# Patient Record
Sex: MALE | Race: WHITE | NOT HISPANIC OR LATINO
[De-identification: names, ages, dates, MRNs, and addresses within clinical notes are randomized per-mention and may not be internally consistent; named-entity substitution may affect disease eponyms.]

---

## 2018-03-13 ENCOUNTER — TRANSCRIPTION ENCOUNTER (OUTPATIENT)
Age: 68
End: 2018-03-13

## 2019-06-18 PROBLEM — Z00.00 ENCOUNTER FOR PREVENTIVE HEALTH EXAMINATION: Status: ACTIVE | Noted: 2019-06-18

## 2019-06-26 ENCOUNTER — APPOINTMENT (OUTPATIENT)
Dept: UROLOGY | Facility: CLINIC | Age: 69
End: 2019-06-26
Payer: MEDICARE

## 2019-06-26 PROCEDURE — 99204 OFFICE O/P NEW MOD 45 MIN: CPT

## 2019-06-26 NOTE — ASSESSMENT
[FreeTextEntry1] : Elevated PSA and concerning 4K, with very low risk appearing MRIx2 and 2 negative biopsy. \par \par Discussed the diagnostic dilemma in this case with patient. \par Reviewed option of TRUS biopy, saturation biopsy, follow up PSA/4K, MRI \par \par Recommend PSA, 4K in 6 mo, and MRI as well but at [facility] or [facility] for optimal protocol and experience of radiologist.

## 2019-06-26 NOTE — PHYSICAL EXAM
[General Appearance - Well Developed] : well developed [General Appearance - Well Nourished] : well nourished [Normal Appearance] : normal appearance [Well Groomed] : well groomed [General Appearance - In No Acute Distress] : no acute distress [Edema] : no peripheral edema [] : no respiratory distress [Exaggerated Use Of Accessory Muscles For Inspiration] : no accessory muscle use [Abdomen Soft] : soft [Respiration, Rhythm And Depth] : normal respiratory rhythm and effort [Abdomen Tenderness] : non-tender [Costovertebral Angle Tenderness] : no ~M costovertebral angle tenderness [Urethral Meatus] : meatus normal [Scrotum] : the scrotum was normal [Urinary Bladder Findings] : the bladder was normal on palpation [Penis Abnormality] : normal circumcised penis [Testes Mass (___cm)] : there were no testicular masses [Epididymis] : the epididymides were normal [Testes Tenderness] : no tenderness of the testes [Normal Station and Gait] : the gait and station were normal for the patient's age [No Prostate Nodules] : no prostate nodules [Prostate Size ___ gm] : prostate size [unfilled] gm [No Palpable Adenopathy] : no palpable adenopathy

## 2019-06-26 NOTE — HISTORY OF PRESENT ILLNESS
[FreeTextEntry1] : CC: Elevated PSA \par \par HPI: Patient with a history of elevated PSA. \par \par PSA: 14 ng/ml 3/2019\par PSA: 16.8 ng/ml 12/2017\par 4K high risk; 58%, 12/2017\par \par MRI 5/19 and MRI 12/17 both PIRAD1\par \par Two biopsy negative; 7/2017 negative biopsy and \par \par FAMHX: no family history \par SURGHX: laminectomy  \par SOCIAL:  nonsmoker\par ROS: dry eyes, YOANA, otherwise negative 10 system\par

## 2022-05-19 ENCOUNTER — APPOINTMENT (OUTPATIENT)
Dept: UROLOGY | Facility: CLINIC | Age: 72
End: 2022-05-19
Payer: MEDICARE

## 2022-05-19 DIAGNOSIS — F10.21 ALCOHOL DEPENDENCE, IN REMISSION: ICD-10-CM

## 2022-05-19 DIAGNOSIS — Z86.69 PERSONAL HISTORY OF OTHER DISEASES OF THE NERVOUS SYSTEM AND SENSE ORGANS: ICD-10-CM

## 2022-05-19 DIAGNOSIS — Z86.79 PERSONAL HISTORY OF OTHER DISEASES OF THE CIRCULATORY SYSTEM: ICD-10-CM

## 2022-05-19 DIAGNOSIS — Z87.438 PERSONAL HISTORY OF OTHER DISEASES OF MALE GENITAL ORGANS: ICD-10-CM

## 2022-05-19 DIAGNOSIS — Z87.891 PERSONAL HISTORY OF NICOTINE DEPENDENCE: ICD-10-CM

## 2022-05-19 DIAGNOSIS — R39.9 UNSPECIFIED SYMPTOMS AND SIGNS INVOLVING THE GENITOURINARY SYSTEM: ICD-10-CM

## 2022-05-19 DIAGNOSIS — Z86.39 PERSONAL HISTORY OF OTHER ENDOCRINE, NUTRITIONAL AND METABOLIC DISEASE: ICD-10-CM

## 2022-05-19 PROCEDURE — 99214 OFFICE O/P EST MOD 30 MIN: CPT | Mod: 95

## 2022-05-19 RX ORDER — ATORVASTATIN CALCIUM 80 MG/1
TABLET, FILM COATED ORAL
Refills: 0 | Status: ACTIVE | COMMUNITY

## 2022-05-19 RX ORDER — LOSARTAN POTASSIUM 100 MG/1
TABLET, FILM COATED ORAL
Refills: 0 | Status: ACTIVE | COMMUNITY

## 2022-05-19 RX ORDER — TAMSULOSIN HYDROCHLORIDE 0.4 MG/1
0.4 CAPSULE ORAL
Qty: 90 | Refills: 3 | Status: ACTIVE | COMMUNITY
Start: 2022-05-19 | End: 1900-01-01

## 2022-05-19 RX ORDER — ATENOLOL 50 MG/1
TABLET ORAL
Refills: 0 | Status: ACTIVE | COMMUNITY

## 2022-05-19 RX ORDER — FINASTERIDE 1 MG/1
TABLET ORAL
Refills: 0 | Status: ACTIVE | COMMUNITY

## 2022-05-19 RX ORDER — NALTREXONE HYDROCHLORIDE 50 MG/1
TABLET, FILM COATED ORAL
Refills: 0 | Status: ACTIVE | COMMUNITY

## 2022-05-20 PROBLEM — F10.21 HISTORY OF ALCOHOLISM: Status: RESOLVED | Noted: 2022-05-20 | Resolved: 2022-05-20

## 2022-05-20 PROBLEM — Z86.79 HISTORY OF HYPERTENSION: Status: RESOLVED | Noted: 2022-05-20 | Resolved: 2022-05-20

## 2022-05-20 PROBLEM — Z87.438 HISTORY OF BENIGN PROSTATIC HYPERPLASIA: Status: RESOLVED | Noted: 2022-05-20 | Resolved: 2022-05-20

## 2022-05-20 PROBLEM — Z86.69 HISTORY OF OBSTRUCTIVE SLEEP APNEA: Status: RESOLVED | Noted: 2022-05-20 | Resolved: 2022-05-20

## 2022-05-20 PROBLEM — Z87.891 FORMER SMOKER: Status: ACTIVE | Noted: 2022-05-20

## 2022-05-20 PROBLEM — Z86.39 HISTORY OF HYPERLIPIDEMIA: Status: RESOLVED | Noted: 2022-05-20 | Resolved: 2022-05-20

## 2022-05-20 PROBLEM — R39.9 LOWER URINARY TRACT SYMPTOMS (LUTS): Status: ACTIVE | Noted: 2022-05-20

## 2022-05-20 NOTE — HISTORY OF PRESENT ILLNESS
[Home] : at home, [unfilled] , at the time of the visit. [Medical Office: ([facility])___] : at the medical office located in  [Verbal consent obtained from patient] : the patient, [unfilled] [FreeTextEntry1] : AMWELL FAILED\par SALVAGED BY TELEPHONE\par \par Dear Dr. Hernan Maldonado (Urologist)\par Dear Dr. Laz Miller (PCP)\par \par Thank you so much for the referral to help care for your patient.\par \par Chief Complaint: Elevated PSA\par Date of first visit: 05/19/2022\par Occupation: \par \par MADDI KYLE  is a 71 year old male with a PMHx of HTN, HLD, BPH, a pituitary adenoma, and YOANA who presents for a prostate biopsy consult. His PSA has been elevated for years. He is on finasteride and does not remember how long he has been taking it, but he knows he has not started any new medications recently. His PSA is 18 ng/ml (corrected for finasteride). His 4K score was 58% in 12/2017. Prior MRIs done in 2017 and 2019 were PIRADS 1, negative for suspicious lesions. An MRI done on 05/16/2022 demonstrated a PIRADS 4 lesion to the left mid anterior transition zone. His PSAD is abnormal (1.05 ng/ml/cc). He has had two prior biopsies, both were benign. He is a former smoker, but does not remember how long he smoked for. He quit years ago. He denies a family history of  malignancies. \par \par Moderate LUTS. He reports frequency, urgency, and occasional double voiding. Nocturia x 3. He is on finasteride, but does not remember when he started it. He knows he has not started any new medications recently. He denies dysuria and hematuria. He is interested in trying alpha blockers. \par \par 4K Score(12/2017)- 58%\par \par PSA History\par 05/2022- 18 (corrected for finasteride 9 x 2- per chart review)\par 03/22/2019- 14.1 \par 12/11/2017- 16.79\par \par MRI History\par MRI 05/16/2022.  17 cc prostate with a PIRADS 4 lesion to the left/mid anterior transition zone measuring 1.2 x 1.3 cm. There is patchy signal abnormality in the left peripheral zone. No LAD No EPE, No Bony Lesions.  The clinical implications discussed with the patient.\par \par MRI 05/23/2019.  30 cc prostate, PIRADS 1, no suspicious lesions. No LAD No EPE, No Bony Lesions.  The clinical implications discussed with the patient.\par \par MRI 12/18/2017.  35 cc prostate, PIRADS 1, no suspicious lesions. No LAD No EPE, No Bony Lesions.  The clinical implications discussed with the patient.\par \par Biopsy History \par 07/2017- 13/13 cores benign\par Had another negative biopsy, does not remember the year.\par \par 05/19/2022\par IPSS 15 QOL 0\par \par Prostate cancer screening: the patient and I spoke at length about prostate cancer screening, its risks and its benefits. The patient has 2 (age, elevated PSA) risk factors for prostate cancer.  He understands that many men with prostate cancer will die with the disease rather than of it and we also discussed the results large multi-center American and  prostate cancer screening trials. He also understands that PSA in and of itself does not diagnose prostate cancer but only assesses risk to a certain degree. The patient understands that to definitively screen for prostate cancer, a biopsy is required and this procedure has risks, including bleeding, infection, ED and urinary retention. The patient opted to proceed with a fusion biopsy.\par \par The patient denies fevers, chills, nausea and or vomiting and no unexplained weight loss.\par \par All pertinent laboratory results and physician notes were reviewed.  Questionnaire results were discussed with patient.

## 2022-05-20 NOTE — ASSESSMENT
[FreeTextEntry1] : MADDI KYLE  is a 71 year old male with a PMHx of HTN, HLD, BPH, a pituitary adenoma, and YOANA who presents for a prostate biopsy consult. His PSA has been elevated for years. He is on finasteride and does not remember how long he has been taking it, but he knows he has not started any new medications recently. His PSA is 18 ng/ml (corrected for finasteride). His 4K score was 58% in 12/2017. Prior MRIs done in 2017 and 2019 were PIRADS 1, negative for suspicious lesions. An MRI done on 05/16/2022 demonstrated a PIRADS 4 lesion to the left mid anterior transition zone. His PSAD is abnormal (1.05 ng/ml/cc). He has had two prior biopsies, both were benign. He is a former smoker, but does not remember how long he smoked for. He quit years ago. He denies a family history of  malignancies.\par \par 1. Obtain all prior MRI images/PSA lab results\par 2. Book for biopsy\par \par He understands that many men with prostate cancer will die with the disease rather than of it and we also discussed the results large multi-center American and  prostate cancer screening trials. He also understands that PSA in and of itself does not diagnose prostate cancer but only assesses risk to a certain degree. The patient understands that to definitively screen for prostate cancer, a biopsy is required and this procedure has risks, including bleeding, infection, ED and urinary retention. The patient opted to move forward with the biopsy.\par \par The patient is aware to expect hematuria x 2 weeks and up to 4 weeks of hematospermia.  There is a risk of infection albeit much lower than a transrectal approach. In some cases, patients can experience erectile dysfunction but this is usually self limiting.  Any fever/chills after the biopsy, the patient is to contact the office and go to the ER for an immediate evaluation. He has been given paper instructions outlining these items - which includes medications to avoid prior to surgery.\par \par \par 1. CBC, BMP, PSA, Covid Test, UA UCx, EKG, CXR, YOANA report\par 2. Medical Clearance\par 3. TP biopsy at [facility]\par 4. Follow up 2 weeks after biopsy with his primary urologist or ourselves.\par 5. We will call with the path results once they are resulted.\par 6. LUTS- Start Flomax 0.4 mg daily. The patient understands that this medication may cause dizziness, retrograde ejaculation or nasal congestion among other complications. I recommended that the patient take this medication at night. If the side effects become too bothersome, the medication can be discontinued.\par \par \par Follow up in office for MRI review/physical exam prior to biopsy date given.\par \par Tami Tadeo NP was present and available for consult for the entirety of this visit.\par

## 2022-05-20 NOTE — HISTORY OF PRESENT ILLNESS
[Home] : at home, [unfilled] , at the time of the visit. [Medical Office: ([facility])___] : at the medical office located in  [Verbal consent obtained from patient] : the patient, [unfilled] [FreeTextEntry1] : AMWELL FAILED\par SALVAGED BY TELEPHONE\par \par Dear Dr. Hernan Maldonado (Urologist)\par Dear Dr. Laz Miller (PCP)\par \par Thank you so much for the referral to help care for your patient.\par \par Chief Complaint: Elevated PSA\par Date of first visit: 05/19/2022\par Occupation: \par \par MADDI KYLE  is a 71 year old male with a PMHx of HTN, HLD, BPH, a pituitary adenoma, and YOANA who presents for a prostate biopsy consult. His PSA has been elevated for years. He is on finasteride and does not remember how long he has been taking it, but he knows he has not started any new medications recently. His PSA is 18 ng/ml (corrected for finasteride). His 4K score was 58% in 12/2017. Prior MRIs done in 2017 and 2019 were PIRADS 1, negative for suspicious lesions. An MRI done on 05/16/2022 demonstrated a PIRADS 4 lesion to the left mid anterior transition zone. His PSAD is abnormal (1.05 ng/ml/cc). He has had two prior biopsies, both were benign. He is a former smoker, but does not remember how long he smoked for. He quit years ago. He denies a family history of  malignancies. \par \par Moderate LUTS. He reports frequency, urgency, and occasional double voiding. Nocturia x 3. He is on finasteride, but does not remember when he started it. He knows he has not started any new medications recently. He denies dysuria and hematuria. He is interested in trying alpha blockers. \par \par 4K Score(12/2017)- 58%\par \par PSA History\par 05/2022- 18 (corrected for finasteride 9 x 2- per chart review)\par 03/22/2019- 14.1 \par 12/11/2017- 16.79\par \par MRI History\par MRI 05/16/2022.  17 cc prostate with a PIRADS 4 lesion to the left/mid anterior transition zone measuring 1.2 x 1.3 cm. There is patchy signal abnormality in the left peripheral zone. No LAD No EPE, No Bony Lesions.  The clinical implications discussed with the patient.\par \par MRI 05/23/2019.  30 cc prostate, PIRADS 1, no suspicious lesions. No LAD No EPE, No Bony Lesions.  The clinical implications discussed with the patient.\par \par MRI 12/18/2017.  35 cc prostate, PIRADS 1, no suspicious lesions. No LAD No EPE, No Bony Lesions.  The clinical implications discussed with the patient.\par \par Biopsy History \par 07/2017- 13/13 cores benign\par Had another negative biopsy, does not remember the year.\par \par 05/19/2022\par IPSS 15 QOL 0\par \par Prostate cancer screening: the patient and I spoke at length about prostate cancer screening, its risks and its benefits. The patient has 2 (age, elevated PSA) risk factors for prostate cancer.  He understands that many men with prostate cancer will die with the disease rather than of it and we also discussed the results large multi-center American and  prostate cancer screening trials. He also understands that PSA in and of itself does not diagnose prostate cancer but only assesses risk to a certain degree. The patient understands that to definitively screen for prostate cancer, a biopsy is required and this procedure has risks, including bleeding, infection, ED and urinary retention. The patient opted to proceed with a fusion biopsy.\par \par The patient denies fevers, chills, nausea and or vomiting and no unexplained weight loss.\par \par All pertinent laboratory results and physician notes were reviewed.  Questionnaire results were discussed with patient.

## 2022-05-20 NOTE — ASSESSMENT
[FreeTextEntry1] : MADDI KYLE  is a 71 year old male with a PMHx of HTN, HLD, BPH, a pituitary adenoma, and YOANA who presents for a prostate biopsy consult. His PSA has been elevated for years. He is on finasteride and does not remember how long he has been taking it, but he knows he has not started any new medications recently. His PSA is 18 ng/ml (corrected for finasteride). His 4K score was 58% in 12/2017. Prior MRIs done in 2017 and 2019 were PIRADS 1, negative for suspicious lesions. An MRI done on 05/16/2022 demonstrated a PIRADS 4 lesion to the left mid anterior transition zone. His PSAD is abnormal (1.05 ng/ml/cc). He has had two prior biopsies, both were benign. He is a former smoker, but does not remember how long he smoked for. He quit years ago. He denies a family history of  malignancies.\par \par 1. Obtain all prior MRI images/PSA lab results\par 2. Book for biopsy\par \par He understands that many men with prostate cancer will die with the disease rather than of it and we also discussed the results large multi-center American and  prostate cancer screening trials. He also understands that PSA in and of itself does not diagnose prostate cancer but only assesses risk to a certain degree. The patient understands that to definitively screen for prostate cancer, a biopsy is required and this procedure has risks, including bleeding, infection, ED and urinary retention. The patient opted to move forward with the biopsy.\par \par The patient is aware to expect hematuria x 2 weeks and up to 4 weeks of hematospermia.  There is a risk of infection albeit much lower than a transrectal approach. In some cases, patients can experience erectile dysfunction but this is usually self limiting.  Any fever/chills after the biopsy, the patient is to contact the office and go to the ER for an immediate evaluation. He has been given paper instructions outlining these items - which includes medications to avoid prior to surgery.\par \par \par 1. CBC, BMP, PSA, Covid Test, UA UCx, EKG, CXR, YOANA report\par 2. Medical Clearance\par 3. TP biopsy at [facility]\par 4. Follow up 2 weeks after biopsy with his primary urologist or ourselves.\par 5. We will call with the path results once they are resulted.\par 6. LUTS- Start Flomax 0.4 mg daily. The patient understands that this medication may cause dizziness, retrograde ejaculation or nasal congestion among other complications. I recommended that the patient take this medication at night. If the side effects become too bothersome, the medication can be discontinued.\par \par \par Follow up in office for MRI review/physical exam prior to biopsy date given.\par \par Tami Tadeo NP was present and available for consult for the entirety of this visit.\par

## 2022-06-03 ENCOUNTER — NON-APPOINTMENT (OUTPATIENT)
Age: 72
End: 2022-06-03

## 2022-06-24 ENCOUNTER — NON-APPOINTMENT (OUTPATIENT)
Age: 72
End: 2022-06-24

## 2022-06-27 ENCOUNTER — APPOINTMENT (OUTPATIENT)
Dept: UROLOGY | Facility: CLINIC | Age: 72
End: 2022-06-27

## 2022-06-27 ENCOUNTER — OUTPATIENT (OUTPATIENT)
Dept: OUTPATIENT SERVICES | Facility: HOSPITAL | Age: 72
LOS: 1 days | End: 2022-06-27
Payer: MEDICARE

## 2022-06-27 VITALS
SYSTOLIC BLOOD PRESSURE: 115 MMHG | BODY MASS INDEX: 25.92 KG/M2 | HEART RATE: 70 BPM | WEIGHT: 175 LBS | DIASTOLIC BLOOD PRESSURE: 71 MMHG | HEIGHT: 69 IN | TEMPERATURE: 97.3 F | OXYGEN SATURATION: 97 %

## 2022-06-27 DIAGNOSIS — R97.20 ELEVATED PROSTATE, SPECIFIC ANTIGEN [PSA]: ICD-10-CM

## 2022-06-27 PROCEDURE — 99214 OFFICE O/P EST MOD 30 MIN: CPT

## 2022-06-27 PROCEDURE — 71046 X-RAY EXAM CHEST 2 VIEWS: CPT | Mod: 26

## 2022-06-28 NOTE — ASSESSMENT
[FreeTextEntry1] : 71 year old male with a PMHx of HTN, HLD, BPH, a pituitary adenoma, and YOANA who presents for a prostate biopsy consult. His PSA has been elevated for years. He is on finasteride and does not remember how long he has been taking it, but he knows he has not started any new medications recently. His PSA is 18 ng/ml (corrected for finasteride). His 4K score was 58% in 12/2017. Prior MRIs done in 2017 and 2019 were PIRADS 1, negative for suspicious lesions. An MRI done on 05/16/2022 demonstrated a PIRADS 4 lesion to the left mid anterior transition zone. His PSAD is abnormal (1.05 ng/ml/cc). He has had two prior biopsies, both were benign. He is a former smoker, but does not remember how long he smoked for. He quit years ago. He denies a family history of  malignancies.\par \par He understands that many men with prostate cancer will die with the disease rather than of it and we also discussed the results large multi-center American and  prostate cancer screening trials. He also understands that PSA in and of itself does not diagnose prostate cancer but only assesses risk to a certain degree. The patient understands that to definitively screen for prostate cancer, a biopsy is required and this procedure has risks, including bleeding, infection, ED and urinary retention. The patient opted to move forward with the biopsy.\par \par The patient is aware to expect hematuria x 2 weeks and up to 4 weeks of hematospermia.  There is a risk of infection albeit much lower than a transrectal approach. In some cases, patients can experience erectile dysfunction but this is usually self limiting.  Any fever/chills after the biopsy, the patient is to contact the office and go to the ER for an immediate evaluation. He has been given paper instructions outlining these items - which includes medications to avoid prior to surgery.\par \par \par 1. CBC, BMP, PSA, Covid Test, UA UCx, EKG, CXR, YOANA report. Plan to go for CXR today. \par 2. Medical Clearance\par 3. TP biopsy at [facility]\par 4. Follow up 2 weeks after biopsy with his primary urologist or ourselves.\par 5. We will call with the path results once they are resulted.\par 6. LUTS- Start Flomax 0.4 mg daily. The patient understands that this medication may cause dizziness, retrograde ejaculation or nasal congestion among other complications. I recommended that the patient take this medication at night. If the side effects become too bothersome, the medication can be discontinued.\par 7. Post biopsy appointment with Dr. Ernst and Dr. Maldonado. \par \par Thank you very much for allowing me to assist in the care of this patient. Should you have any additional questions or concerns please do not hesitate to contact me.\par \par \par Sincerely,\par \par \par Art JENIFER Ernst D.O.\par  of Urology and Radiology\par  of Urology at [facility]\par System Director for Prostate Cancer\par [address] [place] [address], [address] Floor [facility], [facility]\par Phone: [phone]\par

## 2022-06-28 NOTE — HISTORY OF PRESENT ILLNESS
[FreeTextEntry1] : Dear Dr. Hernan Maldonado (Urologist)\par Dear Dr. Laz Miller (PCP)\par \par Thank you so much for the referral to help care for your patient.\par \par Chief Complaint: Elevated PSA\par Date of first visit: 05/19/2022\par Occupation: [employer]\par \par MADDI KYLE  is a 71 year old male with a PMHx of HTN, HLD, BPH, a pituitary adenoma, and YOANA(does not use CPAP) who presents for a prostate biopsy consult. His PSA has been elevated for years. He is on finasteride and does not remember how long he has been taking it, but he knows he has not started any new medications recently. His PSA is 18 ng/ml (corrected for finasteride). His 4K score was 58% in 12/2017. Prior MRIs done in 2017 and 2019 were PIRADS 1, negative for suspicious lesions. An MRI done on 05/16/2022 demonstrated a PIRADS 4 lesion to the left mid anterior transition zone. His PSAD is abnormal (1.05 ng/ml/cc). He has had two prior biopsies, both were benign. He denies a family history of  malignancies. \par \par Moderate LUTS. He reports frequency, urgency, and occasional double voiding. Nocturia x 3. He is on finasteride, but does not remember when he started it). He knows he has not started any new medications recently. He denies dysuria and hematuria. He is interested in trying alpha blockers. \par \par 4K Score(12/2017)- 58%\par \par He is a former smoker, but does not remember how long he smoked for. He quit years ago.\par \par PSA History\par 05/2022- 18 (corrected for finasteride 9 x 2- per chart review). PSAD 1.05 ng/ml/cc\par 03/22/2019- 14.1 \par 12/11/2017- 16.79\par \par MRI History\par MRI 05/16/2022.  17 cc prostate with a PIRADS 4 lesion to the left/mid anterior transition zone measuring 1.2 x 1.3 cm. There is patchy signal abnormality in the left peripheral zone. No LAD No EPE, No Bony Lesions.  The images and clinical implications discussed with the patient. We compared this MRI to his 2017 MRI. Plan to sample anterior portion. \par \par MRI 05/23/2019.  30 cc prostate, PIRADS 1, no suspicious lesions. No LAD No EPE, No Bony Lesions.  The clinical implications discussed with the patient.\par \par MRI 12/18/2017.  35 cc prostate, PIRADS 1, no suspicious lesions. No LAD No EPE, No Bony Lesions.  The clinical implications discussed with the patient.\par \par Biopsy History \par 07/2017- 13/13 cores benign\par Had another negative biopsy, does not remember the year.\par \par 6/27/2022\par IPSS 7 QOL 2\par VON 5 (NSA)\par \par 05/19/2022\par IPSS 15 QOL 0\par \par Prostate cancer screening: the patient and I spoke at length about prostate cancer screening, its risks and its benefits. The patient has 2 (age, elevated PSA) risk factors for prostate cancer.  He understands that many men with prostate cancer will die with the disease rather than of it and we also discussed the results large multi-center American and  prostate cancer screening trials. He also understands that PSA in and of itself does not diagnose prostate cancer but only assesses risk to a certain degree. The patient understands that to definitively screen for prostate cancer, a biopsy is required and this procedure has risks, including bleeding, infection, ED and urinary retention. The patient opted to proceed with a fusion biopsy.\par \par The patient denies fevers, chills, nausea and or vomiting and no unexplained weight loss.\par \par All pertinent laboratory results and physician notes were reviewed.  Questionnaire results were discussed with patient.\par \par I spent 31 minutes of non-face to face time reviewing the patient's records, chart, uploading processing MR images, transferring MR images from PACS to an independent workstation, reviewing images on independent workstation, speaking with their physician and planning their prostate biopsy and preparation of an upcoming visit for 06/27/2022 .  The imaging and planning was highly complex and took this entire time. \par

## 2022-06-28 NOTE — PHYSICAL EXAM
[General Appearance - Well Developed] : well developed [General Appearance - Well Nourished] : well nourished [Normal Appearance] : normal appearance [Well Groomed] : well groomed [General Appearance - In No Acute Distress] : no acute distress [Abdomen Soft] : soft [Abdomen Tenderness] : non-tender [Costovertebral Angle Tenderness] : no ~M costovertebral angle tenderness [Urethral Meatus] : meatus normal [Penis Abnormality] : normal circumcised penis [Urinary Bladder Findings] : the bladder was normal on palpation [Scrotum] : the scrotum was normal [Testes Tenderness] : no tenderness of the testes [Prostate Tenderness] : the prostate was not tender [No Prostate Nodules] : no prostate nodules [Prostate Size ___ gm] : prostate size [unfilled] gm [Edema] : no peripheral edema [] : no respiratory distress [Respiration, Rhythm And Depth] : normal respiratory rhythm and effort [Exaggerated Use Of Accessory Muscles For Inspiration] : no accessory muscle use [Oriented To Time, Place, And Person] : oriented to person, place, and time [Affect] : the affect was normal [Mood] : the mood was normal [Not Anxious] : not anxious [Normal Station and Gait] : the gait and station were normal for the patient's age [No Focal Deficits] : no focal deficits [FreeTextEntry1] : neg JULIAN 06/27/22

## 2022-06-29 ENCOUNTER — RESULT REVIEW (OUTPATIENT)
Age: 72
End: 2022-06-29

## 2022-06-29 ENCOUNTER — TRANSCRIPTION ENCOUNTER (OUTPATIENT)
Age: 72
End: 2022-06-29

## 2022-06-29 NOTE — ASU PATIENT PROFILE, ADULT - NSICDXPASTMEDICALHX_GEN_ALL_CORE_FT
PAST MEDICAL HISTORY:  Back pain     HTN (hypertension)     Hyperlipidemia     Sleep apnea, obstructive

## 2022-06-29 NOTE — ASU PATIENT PROFILE, ADULT - NS PREOP UNDERSTANDS INFO
No solid food, dairy, candy or gum after 10:30pm tonight. water is allowed before 5:30am Thursday. Instructed to come with photo ID, vaccination and insurance card, dress in comfortable clothes, no jewelries, no smoking, drug use or alcohol drinking tonight, escort must show proof of vaccination and photo ID, bring credit card or check for co-payment. Address and call back number provided./yes

## 2022-06-30 ENCOUNTER — TRANSCRIPTION ENCOUNTER (OUTPATIENT)
Age: 72
End: 2022-06-30

## 2022-06-30 ENCOUNTER — APPOINTMENT (OUTPATIENT)
Dept: UROLOGY | Facility: AMBULATORY SURGERY CENTER | Age: 72
End: 2022-06-30

## 2022-06-30 ENCOUNTER — OUTPATIENT (OUTPATIENT)
Dept: OUTPATIENT SERVICES | Facility: HOSPITAL | Age: 72
LOS: 1 days | Discharge: HOME | End: 2022-06-30

## 2022-06-30 VITALS
WEIGHT: 180.78 LBS | DIASTOLIC BLOOD PRESSURE: 73 MMHG | SYSTOLIC BLOOD PRESSURE: 134 MMHG | HEART RATE: 68 BPM | TEMPERATURE: 98 F | RESPIRATION RATE: 16 BRPM | HEIGHT: 70 IN | OXYGEN SATURATION: 98 %

## 2022-06-30 VITALS
SYSTOLIC BLOOD PRESSURE: 139 MMHG | TEMPERATURE: 97 F | RESPIRATION RATE: 16 BRPM | HEART RATE: 71 BPM | DIASTOLIC BLOOD PRESSURE: 86 MMHG | OXYGEN SATURATION: 99 %

## 2022-06-30 DIAGNOSIS — Z98.890 OTHER SPECIFIED POSTPROCEDURAL STATES: Chronic | ICD-10-CM

## 2022-06-30 PROCEDURE — 88305 TISSUE EXAM BY PATHOLOGIST: CPT | Mod: 26

## 2022-06-30 PROCEDURE — 76872 US TRANSRECTAL: CPT | Mod: 26

## 2022-06-30 PROCEDURE — 55706 BX PRST8 NDL SAT SAMPLING: CPT | Mod: AS

## 2022-06-30 PROCEDURE — 76377 3D RENDER W/INTRP POSTPROCES: CPT | Mod: 26

## 2022-06-30 PROCEDURE — 55706 BX PRST8 NDL SAT SAMPLING: CPT

## 2022-06-30 RX ORDER — OXYCODONE HYDROCHLORIDE 5 MG/1
5 TABLET ORAL ONCE
Refills: 0 | Status: DISCONTINUED | OUTPATIENT
Start: 2022-06-30 | End: 2022-06-30

## 2022-06-30 RX ORDER — ACETAMINOPHEN 500 MG
1000 TABLET ORAL ONCE
Refills: 0 | Status: DISCONTINUED | OUTPATIENT
Start: 2022-06-30 | End: 2022-06-30

## 2022-06-30 RX ORDER — KETOROLAC TROMETHAMINE 30 MG/ML
30 SYRINGE (ML) INJECTION ONCE
Refills: 0 | Status: DISCONTINUED | OUTPATIENT
Start: 2022-06-30 | End: 2022-06-30

## 2022-06-30 RX ORDER — DIPHENHYDRAMINE HCL 50 MG
12.5 CAPSULE ORAL ONCE
Refills: 0 | Status: DISCONTINUED | OUTPATIENT
Start: 2022-06-30 | End: 2022-06-30

## 2022-06-30 RX ORDER — NALTREXONE HYDROCHLORIDE 50 MG/1
1 TABLET, FILM COATED ORAL
Qty: 0 | Refills: 0 | DISCHARGE

## 2022-06-30 RX ORDER — FINASTERIDE 5 MG/1
1 TABLET, FILM COATED ORAL
Qty: 0 | Refills: 0 | DISCHARGE

## 2022-06-30 RX ORDER — ONDANSETRON 8 MG/1
4 TABLET, FILM COATED ORAL ONCE
Refills: 0 | Status: DISCONTINUED | OUTPATIENT
Start: 2022-06-30 | End: 2022-06-30

## 2022-06-30 RX ORDER — LOSARTAN POTASSIUM 100 MG/1
1 TABLET, FILM COATED ORAL
Qty: 0 | Refills: 0 | DISCHARGE

## 2022-06-30 RX ORDER — ATENOLOL 25 MG/1
1 TABLET ORAL
Qty: 0 | Refills: 0 | DISCHARGE

## 2022-06-30 RX ORDER — SODIUM CHLORIDE 9 MG/ML
1000 INJECTION, SOLUTION INTRAVENOUS
Refills: 0 | Status: DISCONTINUED | OUTPATIENT
Start: 2022-06-30 | End: 2022-06-30

## 2022-06-30 RX ADMIN — SODIUM CHLORIDE 100 MILLILITER(S): 9 INJECTION, SOLUTION INTRAVENOUS at 09:58

## 2022-06-30 NOTE — ASU DISCHARGE PLAN (ADULT/PEDIATRIC) - NS MD DC FALL RISK RISK
For information on Fall & Injury Prevention, visit: [email or web address] OR  [email or web address] OR  https://www.cdc.gov/steadi/patient.html

## 2022-06-30 NOTE — ASU DISCHARGE PLAN (ADULT/PEDIATRIC) - CARE PROVIDER_API CALL
Ebenezer Ernst (DO)  Urology  [address], 5[address] [facility] [place] [address]  Phone: [phone]  Fax: [phone]  Follow Up Time: 2 weeks

## 2022-06-30 NOTE — BRIEF OPERATIVE NOTE - NSICDXBRIEFPROCEDURE_GEN_ALL_CORE_FT
PROCEDURES:  Biopsy of prostate by transperineal approach with integrated magnetic resonance-ultrasound guidance 30-Jun-2022 09:38:54  Sofia Abel

## 2022-07-01 ENCOUNTER — NON-APPOINTMENT (OUTPATIENT)
Age: 72
End: 2022-07-01

## 2022-07-05 LAB — SURGICAL PATHOLOGY STUDY: SIGNIFICANT CHANGE UP

## 2022-07-06 ENCOUNTER — NON-APPOINTMENT (OUTPATIENT)
Age: 72
End: 2022-07-06

## 2022-07-06 PROBLEM — E78.5 HYPERLIPIDEMIA, UNSPECIFIED: Chronic | Status: ACTIVE | Noted: 2022-06-30

## 2022-07-06 PROBLEM — M54.9 DORSALGIA, UNSPECIFIED: Chronic | Status: ACTIVE | Noted: 2022-06-30

## 2022-07-06 PROBLEM — I10 ESSENTIAL (PRIMARY) HYPERTENSION: Chronic | Status: ACTIVE | Noted: 2022-06-30

## 2022-07-06 PROBLEM — G47.33 OBSTRUCTIVE SLEEP APNEA (ADULT) (PEDIATRIC): Chronic | Status: ACTIVE | Noted: 2022-06-30

## 2022-07-06 LAB — SARS-COV-2 N GENE NPH QL NAA+PROBE: NOT DETECTED

## 2022-07-18 ENCOUNTER — APPOINTMENT (OUTPATIENT)
Dept: UROLOGY | Facility: CLINIC | Age: 72
End: 2022-07-18

## 2022-07-18 VITALS — DIASTOLIC BLOOD PRESSURE: 76 MMHG | SYSTOLIC BLOOD PRESSURE: 117 MMHG | HEART RATE: 96 BPM | TEMPERATURE: 98 F

## 2022-07-18 PROCEDURE — 99213 OFFICE O/P EST LOW 20 MIN: CPT

## 2022-07-18 RX ORDER — ATORVASTATIN CALCIUM 20 MG/1
20 TABLET, FILM COATED ORAL
Qty: 90 | Refills: 0 | Status: ACTIVE | COMMUNITY
Start: 2022-06-21

## 2022-07-18 RX ORDER — PROPRANOLOL HYDROCHLORIDE 10 MG/1
10 TABLET ORAL
Qty: 30 | Refills: 0 | Status: ACTIVE | COMMUNITY
Start: 2022-06-21

## 2022-07-18 RX ORDER — LOSARTAN POTASSIUM 50 MG/1
50 TABLET, FILM COATED ORAL
Qty: 90 | Refills: 0 | Status: ACTIVE | COMMUNITY
Start: 2022-06-21

## 2022-07-18 RX ORDER — DICLOFENAC SODIUM 50 MG/1
50 TABLET, DELAYED RELEASE ORAL
Qty: 30 | Refills: 0 | Status: ACTIVE | COMMUNITY
Start: 2022-05-09

## 2022-07-19 ENCOUNTER — APPOINTMENT (OUTPATIENT)
Dept: UROLOGY | Facility: CLINIC | Age: 72
End: 2022-07-19

## 2022-07-19 VITALS
SYSTOLIC BLOOD PRESSURE: 132 MMHG | DIASTOLIC BLOOD PRESSURE: 80 MMHG | OXYGEN SATURATION: 97 % | HEART RATE: 66 BPM | TEMPERATURE: 97.3 F

## 2022-07-19 PROCEDURE — 99215 OFFICE O/P EST HI 40 MIN: CPT

## 2022-07-19 NOTE — ASSESSMENT
[FreeTextEntry1] : 71 year old male with GG2 prostate cancer (LAB, RAB, left mid AFM) and PSA 18 ng/ml. On dual therapy. Given PSAD is 1.05 ng/ml, he would not be ideal candidate for focal therapy. \par \par 1. Dr Lee and Dr Garcia for treatment discussion.\par 2. PSMA PET/CT given PSA 18 and PSAD 1.05 ng/ml/cc\par 3. Continue dual therapy for BPH\par \par Thank you very much for allowing me to assist in the care of this patient. Should you have any additional questions or concerns please do not hesitate to contact me.\par \par \par Sincerely,\par \par \par Kevin Ernst D.O.\par  of Urology and Radiology\par  of Urology at [facility]\par System Director for Prostate Cancer\par [address] [place] [address], 5th Floor [facility], [address]\par Phone: [phone]\par \par \par \par

## 2022-07-19 NOTE — HISTORY OF PRESENT ILLNESS
[FreeTextEntry1] : CC: prostate cancer \par \par GG2\par 1.3 cm lesion/PIRAD4 appearing organ confined\par \par Mild LUTS/urgency\par Erections: not sexually active- not getting erections and "not missing it"\par \par PSMA scan pending\par \par \par FAMHX: negative CAP\par SURG: laminectomy 2010\par SOCIAL: nonsmoker, retired  from the [employer], single

## 2022-07-19 NOTE — PHYSICAL EXAM
[General Appearance - Well Developed] : well developed [General Appearance - Well Nourished] : well nourished [Normal Appearance] : normal appearance [Well Groomed] : well groomed [General Appearance - In No Acute Distress] : no acute distress [Edema] : no peripheral edema [] : no respiratory distress [Respiration, Rhythm And Depth] : normal respiratory rhythm and effort [Exaggerated Use Of Accessory Muscles For Inspiration] : no accessory muscle use [Abdomen Soft] : soft [Abdomen Tenderness] : non-tender [Costovertebral Angle Tenderness] : no ~M costovertebral angle tenderness [Urethral Meatus] : meatus normal [Urinary Bladder Findings] : the bladder was normal on palpation [Scrotum] : the scrotum was normal [Testes Mass (___cm)] : there were no testicular masses [No Focal Deficits] : no focal deficits [Oriented To Time, Place, And Person] : oriented to person, place, and time [Affect] : the affect was normal [Mood] : the mood was normal [Not Anxious] : not anxious [No Palpable Adenopathy] : no palpable adenopathy

## 2022-07-19 NOTE — HISTORY OF PRESENT ILLNESS
[FreeTextEntry1] : Dear Dr. Hernan Maldonado (Urologist)\par Dear Dr. Laz Miller (PCP)\par \par Thank you so much for the referral to help care for your patient.\par \par Chief Complaint: Elevated PSA\par Date of first visit: 05/19/2022\par Occupation: [employer]\par \par MADDI KYLE  is a 71 year old male with a PMHx of HTN, HLD, BPH, a pituitary adenoma, and YOANA(does not use CPAP) who presents for newly diagnosed prostate cancer. He underwent a targeted prostate biopsy with the UroNav MRI fusion on 06/30/22. The targeted biopsy detected GG2 cancer (left mid AFM). The standard biopsy detected GG2-GG1 cancer (LAB, RAB). \par \par He feels well post biopsy. He has already met with Dr Maldonado and has appointments with Dr Lee and Dr Garcia. The RAA does not overlap with the MRI visible lesion.\par \par His PSA is 18 ng/ml (corrected for finasteride). His 4K score was 58% in 12/2017. Prior MRIs done in 2017 and 2019 were PIRADS 1, negative for suspicious lesions. An MRI done on 05/16/2022 demonstrated a PIRADS 4 lesion to the left mid anterior transition zone. His PSAD is abnormal (1.05 ng/ml/cc). He has had two prior biopsies, both were benign. He denies a family history of  malignancies. \par \par Moderate LUTS. He reports occasional incomplete emptying, urgency and frequency. Nocturia x1. He has not been able to tolerate YOANA mask. He is on dual therapy (finasteride and flomax). He denies dysuria and hematuria. \par \par 4K Score(12/2017)- 58%\par \par He is a former smoker, but does not remember how long he smoked for. He quit years ago.\par \par PSA History\par 05/2022- 18 (corrected for finasteride 9 x 2- per chart review). PSAD 1.05 ng/ml/cc\par 03/22/2019- 14.1 \par 12/11/2017- 16.79\par \par MRI History\par MRI 05/16/2022.  17 cc prostate with a PIRADS 4 lesion to the left/mid anterior transition zone measuring 1.2 x 1.3 cm. There is patchy signal abnormality in the left peripheral zone. No LAD No EPE, No Bony Lesions.  The images and clinical implications discussed with the patient. We compared this MRI to his 2017 MRI. Plan to sample anterior portion. \par \par MRI 05/23/2019.  30 cc prostate, PIRADS 1, no suspicious lesions. No LAD No EPE, No Bony Lesions.  The clinical implications discussed with the patient.\par \par MRI 12/18/2017.  35 cc prostate, PIRADS 1, no suspicious lesions. No LAD No EPE, No Bony Lesions.  The clinical implications discussed with the patient.\par \par Biopsy Hx:\par 6/30/2022- with Dr Ernst\par LAB- [place] 3+4, 60%, 6mm, 10% pattern 4\par RAA- Angelo 6, <5%, <0.5mm\par Left mid AFM- Angelo 3+4, 40%, 3mm, 10% pattern 4)\par \par 07/2017- 13/13 cores benign\par Had another negative biopsy, does not remember the year.\par \par 07/18/2022\par IPSS  4 QOL  3\par VON 4-NSA\par \par 6/27/2022\par IPSS 7 QOL 2\par VON 5 (NSA)\par \par 05/19/2022\par IPSS 15 QOL 0\par \par The patient denies fevers, chills, nausea and or vomiting and no unexplained weight loss.\par \par All pertinent laboratory results and physician notes were reviewed.  Questionnaire results were discussed with patient.\par \par Discussion with patient: Risks/Benefits/FDA recommendations for prostate cancer screening, natural history of prostate cancer, the concept of "competing risks", options for treatment including: active surveillance, open and laparoscopic (Robotic) radical prostatectomy, external beam radiation therapy, interstitial brachytherapy ("seeds"), combined therapies, hormonal therapies, orchiectomy, and cryotherapy. The potential side effects, early and delayed risks, and effectiveness of each treatment was discussed. The specific details of this patient's disease and their bearing on the above were discussed. The patient was offered the opportunity to meet with the Radiation Oncologists. The patient asked appropriate contextual questions indicating a good level of understanding.\par \par We discussed the surgical options for management including robotic and open prostatectomy. The patient understands that the risks of these procedures include bleeding infection, damage to the rectum and surrounding organs as well as ED and urinary incontinence, both of which may be persistent. The advantages include removing the entire prostate for more accurate pathologic staging. There is more blood loss with the open approach than with the laparoscopic approach.\par \par We also discussed the options of radiation (external beam, brachytherapy or both). Definitive brachytherapy at our institution is done with I-125 (T1/2 60.2 days) which has high energy but slightly more toxicity. Combination therapy is done with Pd-103 (T1/2 17 days) and IGRT. The patient understands that the risk of radiation include increased LUTS, diarrhea, hematuria, difficulty urinating, ED, and urinary frequency. Based upon the patient's PSA/pathology and risk stratification the patient would need androgen deprivation in the neoadjuvant and adjuvant setting if he chooses a radiation monotherapy.\par \par We also discussed active surveillance. The patient understands that this is not a treatment, but a way of monitoring potentially indolent disease. The patient understands that this has minimal side effects but there is a risk of disease progression.\par \par \par The patient and I discussed all of these options as well as their risks and benefits and I believe I answered his questions. Based on the patient's disease characteristics I feel the patient would be a good candidate for surgery or radiatoin.\par The patient decided to discuss surgery and radiation therapy with our team\par \par \par

## 2022-07-19 NOTE — ADDENDUM
[FreeTextEntry1] : I, Dr. Ernst, personally performed the evaluation and management (E/M) services for this established patient who presents today with (a) new problem(s)/exacerbation of (an) existing condition(s).  That E/M includes conducting the examination, assessing all new/exacerbated conditions, and establishing a new plan of care.  Today, my ACP, Hanh Duran NP, was here to observe my evaluation and management services for this new problem/exacerbated condition to be followed going forward\par \par IRB   MR/TRUS FUSION GUIDED PROSTATE BIOPSY- AN IMPROVED WAY TO DETECT AND QUANTIFY PROSTATE CANCER\par \par Inclusion criteria have been reviewed and it has been determined that the subject has met all inclusion criteria.\par Exclusion criteria have been reviewed and it has been determined that the subject does not meet any exclusion criteria.\par \par The following was discussed during the consent process:\par ·	The fact that the study involves research\par ·	The study schedule and procedures involved\par ·	The main risks of the study, and the fact that all risks may not be known at this time\par ·	New information that may affect the subject’s willingness to continue on the study will be presented as soon as it is available\par ·	Benefits of participating\par ·	Alternatives to participating\par ·	Confidentiality \par ·	Compensation for research-related injury\par ·	Contacts for questions about the study or their rights while on the study\par ·	The fact that the subject’s participation is voluntary - they can refuse or withdraw \par at any time without penalty or loss of benefits.\par \par The subject was given ample time to ask questions prior to signing - all questions were answered to the subject’s satisfaction.\par \par Contact information for research staff was given to the subject.	\par The subject has expressed his/her willingness to participate.	\par \par Opportunity to sign the consent electronically was offered to the subject. Study team to contact the subject to assist with e-consenting though the [employer] platform. \par \par OR  \par \par Subject will sign printed consent on day of procedure.\par

## 2022-07-19 NOTE — ASSESSMENT
[FreeTextEntry1] : Diagnosis: prostate cancer\par \par Today we discussed the natural history of localized prostate cancer, and the heterogeneous biology of this malignancy.  We discussed the fact that many prostate cancers are slow growing and unlikely to metastasize or cause death, whereas others can be life threatening.  We reviewed risk stratification, staging, Angelo scoring, and PSA levels as they pertain to risk.  \par \par All treatment options were reviewed.  This included active surveillance, androgen deprivation, emerging options such as focal therapy/HIFU/cryotherapy, radiation options (including IMRT, SBRT, brachytherapy) and surgical options (open vs. robotic surgery, SP and MP, nerve vs. non-nerve sparing).  Oncologic outcomes were compared and contrasted.  Risks of biochemical and clinical recurrence discussed.  Risks of needing adjuvant or salvage treatments reviewed.  We discussed the risks of secondary malignancy after radiation.  We discussed the opportunity for pathological staging with surgery vs. other options.  We discussed the possibility of positive margins, treatment failure, cancer recurrence and cancer-related death even with treatment. \par \par All potential side effects of treatment were reviewed including, but not limited to: short term or permanent stress urinary incontinence and/or short term or permanent erectile dysfunction, penile shortening, rectal symptoms/pain, perineal pain, and other side effects. \par \par All potential complications of treatment and surgery were reviewed including, but not limited to: risks of conversion from MIS to open surgery discussed, bleeding//life-threatening hemorrhage, rectal injury requiring colostomy or delayed recognition leading to fistula, vascular/bowel/adjacent visceral organ injury, trocar/access injury, the possibility of recognized vs. unrecognized/delayed-recognition injury, risks of thermal/blunt/sharp/retraction injury, risks of DVT, PE, MI, death, risks of cardiopulmonary/anesthesia related complications, positional injury, infection/collection/abscess, wound complications/dehiscence/seroma/cellulitis, urinoma/fistula, ureteral injury/obstruction, bladder neck contracture, penile shortening, meatal stenosis, urethral stricture, lymphocele, obturator nerve injury, prolonged anastomotic leak, and other complications. \par \par Plan \par PSMA scan \par Patient elects surgery\par \par Eddie Lee MD, [facility] \par  of Urology [facility]\par Director of Laparoscopic and Robotic Surgery \par [facility] Director of Urology, [facility] \par Professor of Urology\par \par (Office) [phone]\par (Cell)  [phone] \par [email or web address]\par \par \par \par \par \par \par \par

## 2022-07-27 ENCOUNTER — APPOINTMENT (OUTPATIENT)
Dept: RADIATION ONCOLOGY | Facility: CLINIC | Age: 72
End: 2022-07-27

## 2022-07-27 VITALS
WEIGHT: 180 LBS | OXYGEN SATURATION: 98 % | HEART RATE: 74 BPM | HEIGHT: 69 IN | TEMPERATURE: 97.1 F | SYSTOLIC BLOOD PRESSURE: 129 MMHG | BODY MASS INDEX: 26.66 KG/M2 | RESPIRATION RATE: 16 BRPM | DIASTOLIC BLOOD PRESSURE: 78 MMHG

## 2022-07-27 PROCEDURE — 99204 OFFICE O/P NEW MOD 45 MIN: CPT | Mod: 25

## 2022-07-27 NOTE — REVIEW OF SYSTEMS
[IPSS Score (0-40): ___] : IPSS score: [unfilled] [EPIC-CP Score (0-60): ___] : EPIC-CP score: [unfilled] [Negative] : Heme/Lymph [Anal Pain: Grade 0] : Anal Pain: Grade 0 [Constipation: Grade 0] : Constipation: Grade 0 [Diarrhea: Grade 0] : Diarrhea: Grade 0 [Nausea: Grade 0] : Nausea: Grade 0 [Vomiting: Grade 0] : Vomiting: Grade 0 [Hematuria: Grade 0] : Hematuria: Grade 0 [Urinary Incontinence: Grade 0] : Urinary Incontinence: Grade 0  [Urinary Retention: Grade 0] : Urinary Retention: Grade 0 [Urinary Tract Pain: Grade 0] : Urinary Tract Pain: Grade 0 [Urinary Urgency: Grade 1 - Present] : Urinary Urgency: Grade 1 - Present [Urinary Frequency: Grade 0] : Urinary Frequency: Grade 0 [Ejaculation Disorder: Grade 0] : Ejaculation Disorder: Grade 0 [Erectile Dysfunction: Grade 0] : Erectile Dysfunction: Grade 0

## 2022-07-29 ENCOUNTER — APPOINTMENT (OUTPATIENT)
Dept: NUCLEAR MEDICINE | Facility: HOSPITAL | Age: 72
End: 2022-07-29

## 2022-07-29 ENCOUNTER — OUTPATIENT (OUTPATIENT)
Dept: OUTPATIENT SERVICES | Facility: HOSPITAL | Age: 72
LOS: 1 days | End: 2022-07-29
Payer: MEDICARE

## 2022-07-29 DIAGNOSIS — Z98.890 OTHER SPECIFIED POSTPROCEDURAL STATES: Chronic | ICD-10-CM

## 2022-07-29 LAB — GLUCOSE BLDC GLUCOMTR-MCNC: 108 MG/DL — HIGH (ref 70–99)

## 2022-07-29 PROCEDURE — 78816 PET IMAGE W/CT FULL BODY: CPT | Mod: 26,MH

## 2022-07-29 PROCEDURE — A9595: CPT

## 2022-07-29 PROCEDURE — 78816 PET IMAGE W/CT FULL BODY: CPT

## 2022-07-29 PROCEDURE — 82962 GLUCOSE BLOOD TEST: CPT

## 2022-08-02 ENCOUNTER — NON-APPOINTMENT (OUTPATIENT)
Age: 72
End: 2022-08-02

## 2022-08-02 NOTE — DISEASE MANAGEMENT
[1] : T1 [c] : c [0] : N0 [10-20] : 10 - 20 ng/mL [7(3+4)] : Template Biopsy [place] Score: 7(3+4) [] : Patient had a Prostate MRI [4] : 4 [IIB] : IIB [BiopsyDate] : 6/30/22 [MeasuredProstateVolume] : 17 [TotalCores] : 17 [TotalPositiveCores] : 4

## 2022-08-02 NOTE — HISTORY OF PRESENT ILLNESS
[FreeTextEntry1] : \par Priyank Lockwood was seen in consultation for his prostate cancer.  \par \par He is a 71 year old who has had an elevated PSA at least since 2017.  Due to LUTS, he has been on finasteride since that time.  Meanwhile his PSA has been in the 10+ range.  This has prompted three MRI's and three biopsies with only the latest yielding a cancer diagnosis.   On 6/30 4 of 13 cores were positive, two with [place] 6 and two with [place] 3+4.  \par \par No nodes were seen on MRI.  The gland has been measured from 17 to 40 cc.  He is scheduled for a PET tomorrow (7/27/2022).  He was been advised to see a definitive treatment with surgery being favored by urology. \par He is not sexually active.  He is here to discuss his options with radiation. \par \par **********************************************************************************************************************************\par Mr. Priyank Lockwood is a 71 year old male diagnosed with unfavorable risk prostate adenocarcinoma, Angelo 7(3+4), PSA 18 ng/mL (9 ng/mL on Finasteride). On 6/30/22 he was s/p Prostate Biopsy (4/17 cores positive). \par \par 6/17/17- PSA 11.1 ng/mL\par \par 7/20/17- Prostate Biopsy per Dr. Cruz  (0/13 cores) \par \par 12/11/2017- PSA 16.79 ng/mL\par \par 12/18/2017 MRI Prostate\par 35 cc prostate, PIRADS 1, no suspicious lesions. No LAD No EPE, No Bony Lesions. The clinical implications discussed with the patient.\par \par 03/22/2019- PSA 14.1 ng/mL\par \par 05/23/2019- MRI Prostate\par  30 cc prostate, PIRADS 1, no suspicious lesions. No LAD No EPE, No Bony Lesions. The clinical implications discussed with the patient.\par \par \par 05/2022- PSA 18 ng/mL (corrected for finasteride 9 ng/mL x 2)\par \par \par 05/16/2022- MRI Prostate\par FINDINGS:  \par The prostate gland measures 2.9 x 2.9 x 3.9 cm (AP, transverse, and craniocaudad dimension) yielding a volume of 17 cc. \par There is a newly conspicuous T2 hypointense signal intensity lesion in the left/mid anterior transition zone at the base measuring 1.2 x 1.3 cm. There is likely restricted diffusion on DWI, slightly limited due to motion artifact. Early enhancement after contrast.\par T2 PI-RADS score 4\par DWI PI-RADS score 4 \par EDC PI-RADS score positive   \par Mild patchy T2 hypointense signal intensity throughout the left peripheral zone with vascularity after gadolinium. No restricted diffusion on DWI.\par T2 PI-RADS score 2\par DWI PI-RADS score 1 \par EDC PI-RADS score positive   \par There is no enlargement of the transitional zone. There is no extracapsular extension or seminal vesicle invasion. No post biopsy hemorrhage is visualized.\par The bladder is unremarkable. \par There is no pelvic lymphadenopathy. There is no pelvic free fluid.\par There is no suspect osseous lesion.\par \par IMPRESSION:  \par 1. Lesion in the anterior left/mid transition zone at the base suspicious for transition zone prostate cancer. Recommend a targeted biopsy. Key images available.\par PI-RADS Category 4: High (clinically significant cancer is likely to be present).\par 2. Patchy signal abnormality in the left peripheral zone with vascularity likely focal prostatitis.\par \par 5/16/22- MRI Lumbar Spine\par IMPRESSION:  MRI of the lumbar spine demonstrates:\par 1.  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 Most notably at L4-L5, where there is moderate to severe spinal canal stenosis, mild to moderate left and moderate right neural foraminal stenosis.\par 2.  Evidence of prior L5-S1 left hemilaminectomy, with ill-defined soft tissue, likely disc herniation and/or granulation tissue, which results in narrowing of the left lateral recess and severe left foraminal stenosis. This overall appearance is similar to minimally progressed compared to the prior study.\par 3.  L3-L4: Mild to moderate spinal canal stenosis. Mild to moderate bilateral neural foraminal stenosis.\par 4.  L2-L3: Moderate spinal canal stenosis. Mild bilateral neuroforaminal stenosis.\par 5.  Rudimentary disc at S1-S2.\par \par \par 6/27/22- PA/LAT Chest X-Ray\par No acute osseous abnormality, Chronic Left 7th rib fx, degenerative changes of spine, clear lungs\par \par 6/30/22- Prostate Biopsy  (4/17 cores positive) \par Final Diagnosis\par 1. Prostate, left anterior apex, biopsy\par -Benign prostate tissue.\par \par 2. Prostate, left anterior base, biopsy\par -Adenocarcinoma of the prostate, Prognostic Grade Group 2 (Angelo\par score 3+4=7) involving 60% (6 mm in length) of 1 of 1 core(s). [place]\par pattern 4 comprises 10% of tumor.\par \par 3. Prostate, right anterior apex, biopsy\par -Adenocarcinoma of the prostate, Prognostic Grade Group 1 (Angelo\par score 3+3=6) involving less than 5% (less than 0.5 mm in length) of 1 of\par 1 core(s).\par \par 4. Prostate, right anterior base, biopsy\par -Benign prostate tissue.\par \par 5. Prostate, midline apex, biopsy\par -Benign prostate tissue.\par \par 6. Prostate, midline base, biopsy\par -Benign prostate tissue.\par \par 7. Prostate, left posterior apex, biopsy\par -Prostate tissue with a small focus of atypical glands.\par \par 8. Prostate, left posterior base, biopsy\par -Benign prostate tissue.\par \par 9. Prostate, right posterior apex, biopsy\par -Benign prostate tissue.\par \par 10. Prostate, right posterior base, biopsy\par -Benign prostate tissue.\par \par 11. Prostate, left lateral, biopsy\par -Benign prostate tissue.\par \par 12. Prostate, right lateral, biopsy\par -Benign prostate tissue.\par \par 13. Prostate, left mid AFM, biopsy\par -Adenocarcinoma of the prostate, Prognostic Grade Group 2 ([place]\par score 3+4=7) involving 40% and 15% (3 mm and 2 mm in length) of 2 of 5\par core(s). [place] pattern 4 comprises 10% of tumor.\par \par 7/19/22- consulted with Dr. Lee \par \par 7/27/2022: NEW CONSULT\par Today he presents for consideration of radiation therapy to the prostate, referred by Dr. Ernst.  Overall, the patient states he feels well and denies any radiation therapy in the past.  He notes baseline urine function with and nocturia x 1 at night and occasional urgency. He denies urinary frequency, dysuria, hematuria, leakage or incontinence. He has well-formed bowel movements once a day. He is scheduled to have PET scan on 7/29/2022 and prostate surgery on 8/29/2022.No consent signed. He will return to make an appointment with  before his scheduled surgery.\par

## 2022-08-22 ENCOUNTER — APPOINTMENT (OUTPATIENT)
Dept: RADIATION ONCOLOGY | Facility: CLINIC | Age: 72
End: 2022-08-22

## 2022-08-22 VITALS
RESPIRATION RATE: 16 BRPM | OXYGEN SATURATION: 97 % | HEART RATE: 77 BPM | HEIGHT: 69 IN | WEIGHT: 183 LBS | BODY MASS INDEX: 27.11 KG/M2 | DIASTOLIC BLOOD PRESSURE: 78 MMHG | TEMPERATURE: 97.4 F | SYSTOLIC BLOOD PRESSURE: 130 MMHG

## 2022-08-22 DIAGNOSIS — C61 MALIGNANT NEOPLASM OF PROSTATE: ICD-10-CM

## 2022-08-22 PROCEDURE — 99215 OFFICE O/P EST HI 40 MIN: CPT

## 2022-08-22 NOTE — HISTORY OF PRESENT ILLNESS
[FreeTextEntry1] : Mr. Lockwood is a 71 years old male with Angelo 3+4=7 adenocarcinoma of the prostate. Patient was initially seen by Dr. Garcia on 7/27/22 when brachytherapy was discussed. Patient has history of obstructive sleep apnea, BPH, pituitary adenoma. His 4K score was 58% on 12/2017. He is an  for [employer].\par \par PSA\par 5/2022   -  18\par 3/22/19  -  14.1\par 12/11/17-  15.79\par \par Patient had PIRADS 1 MRI in 2017 and 2019. A recent MRI on 5/16/22 showed prostate volume of 17 cc. Lesion in the anterior left/mid transition zone at the base suspicious for transition zone prostate cancer. PIRADS 4. Also noted was patchy signal abnormality in the left peripheral zone. No EPE was noted.\par \par Patient underwent MRI fusion biopsy on 6/30/22, pathology report indicated total of 4 out of 17 cores positive for cancer with [place] score 3+4=7 involving 5% - 60% of the tissue. 2 cores of MRI fusion was positive for Ashley 3+4, while 2 out of standard biopsy was positive, one core with Angelo 3+4 and another with 3+3. \par \par He had PSMA PET/CT on 7/29/22 which demonstrated focal radiotracer uptake within the left anterior prostate gland. No evidence of distal metastases or lymphadenopathy was noted.\par \par Patient presents today to discuss his radiation therapy option. Patient complains of dribbling urine and occasionally has 6-8 times bowel movement in the morning. He is not sexually active. He denies urgency, urinary frequency, dribbling, dysuria, hematuria, leakage or incontinence. He has well-formed bowel movements. He denies blood or mucous in the stool. Educated on the procedure, treatment and side-effects of radiation treatment. All concerns addressed. Requisition given for PSA level.\par \par

## 2022-08-22 NOTE — REVIEW OF SYSTEMS
[IPSS Score (0-40): ___] : IPSS score: [unfilled] [EPIC-CP Score (0-60): ___] : EPIC-CP score: [unfilled] [Negative] : Psychiatric [Constipation: Grade 0] : Constipation: Grade 0 [Hematuria: Grade 0] : Hematuria: Grade 0 [Urinary Incontinence: Grade 0] : Urinary Incontinence: Grade 0  [Urinary Retention: Grade 0] : Urinary Retention: Grade 0 [Urinary Tract Pain: Grade 0] : Urinary Tract Pain: Grade 0 [Urinary Urgency: Grade 0] : Urinary Urgency: Grade 0 [Urinary Frequency: Grade 0] : Urinary Frequency: Grade 0 [Ejaculation Disorder: Grade 1 - Diminished ejaculation] : Ejaculation Disorder: Grade 1 - Diminished ejaculation  [Erectile Dysfunction: Grade 1 - Decrease in erectile function (frequency or rigidity of erections) but intervention not indicated (e.g., medication or use of mechanical device, penile pump)] : Erectile Dysfunction: Grade 1 - Decrease in erectile function (frequency or rigidity of erections) but intervention not indicated (e.g., medication or use of mechanical device, penile pump) [Nocturia] : no nocturia [Urinary Frequency] : no urinary frequency

## 2022-08-22 NOTE — DISEASE MANAGEMENT
[1] : T1 [c] : c [0] : M0 [10-20] : 10 - 20 ng/mL [Biopsy with Fusion] : Patient had a biopsy with fusion on [7(3+4)] : Fusion Biopsy [place] Score: 7(3+4) [] : Patient had a Prostate MRI [4] : 4 [IIB] : IIB [BiopsyDate] : 6/30/22 [MeasuredProstateVolume] : 17 cc [TotalCores] : 17 [TotalPositiveCores] : 4 [MaxCoreInvolvement] : 60%

## 2022-08-25 ENCOUNTER — NON-APPOINTMENT (OUTPATIENT)
Age: 72
End: 2022-08-25

## 2022-09-14 ENCOUNTER — APPOINTMENT (OUTPATIENT)
Dept: UROLOGY | Facility: HOSPITAL | Age: 72
End: 2022-09-14

## 2022-09-15 LAB — PSA SERPL-MCNC: 10.4 NG/ML

## 2022-12-13 ENCOUNTER — NON-APPOINTMENT (OUTPATIENT)
Age: 72
End: 2022-12-13

## 2025-01-12 NOTE — ADDENDUM
[FreeTextEntry1] : I, Dr. Ernst, personally performed the evaluation and management (E/M) services for this established patient who presents today with (a) new problem(s)/exacerbation of (an) existing condition(s).  That E/M includes conducting the examination, assessing all new/exacerbated conditions, and establishing a new plan of care.  Today, my ACP, Hanh Duran NP, was here to observe my evaluation and management services for this new problem/exacerbated condition to be followed going forward\par \par IRB   MR/TRUS FUSION GUIDED PROSTATE BIOPSY- AN IMPROVED WAY TO DETECT AND QUANTIFY PROSTATE CANCER\par \par Inclusion criteria have been reviewed and it has been determined that the subject has met all inclusion criteria.\par Exclusion criteria have been reviewed and it has been determined that the subject does not meet any exclusion criteria.\par \par The following was discussed during the consent process:\par ·	The fact that the study involves research\par ·	The study schedule and procedures involved\par ·	The main risks of the study, and the fact that all risks may not be known at this time\par ·	New information that may affect the subject’s willingness to continue on the study will be presented as soon as it is available\par ·	Benefits of participating\par ·	Alternatives to participating\par ·	Confidentiality \par ·	Compensation for research-related injury\par ·	Contacts for questions about the study or their rights while on the study\par ·	The fact that the subject’s participation is voluntary - they can refuse or withdraw \par at any time without penalty or loss of benefits.\par \par The subject was given ample time to ask questions prior to signing - all questions were answered to the subject’s satisfaction.\par \par Contact information for research staff was given to the subject.	\par The subject has expressed his/her willingness to participate.	\par \par Opportunity to sign the consent electronically was offered to the subject. Study team to contact the subject to assist with e-consenting though the [employer] platform. \par \par OR  \par \par Subject will sign printed consent on day of procedure.\par 
No

## 2025-07-25 ENCOUNTER — NON-APPOINTMENT (OUTPATIENT)
Age: 75
End: 2025-07-25

## (undated) DEVICE — NDL MAX CORE 18G X 25CM

## (undated) DEVICE — BALLOON ENDOCAVITY 2X14CM

## (undated) DEVICE — DRAPE TOWEL BLUE 17" X 24"

## (undated) DEVICE — DRAPE MEDIUM SHEET 44" X 70"

## (undated) DEVICE — NDL BIOPSY CHIBA 22G X 20CM

## (undated) DEVICE — GRID BRACHYTHERAPY EZ 18G

## (undated) DEVICE — PREP CHLORAPREP HI-LITE ORANGE 26ML

## (undated) DEVICE — SYR LUER LOK 10CC

## (undated) DEVICE — NDL HYPO REGULAR BEVEL 25G X 1.5" (BLUE)

## (undated) DEVICE — GLV 8 PROTEXIS (WHITE)

## (undated) DEVICE — SYR CATH TIP 2 OZ

## (undated) DEVICE — DRSG TELFA 3 X 8

## (undated) DEVICE — SYR LUER LOK 50CC

## (undated) DEVICE — PLASTIC SOLUTION BOWL 160Z